# Patient Record
Sex: MALE | Race: WHITE | URBAN - NONMETROPOLITAN AREA
[De-identification: names, ages, dates, MRNs, and addresses within clinical notes are randomized per-mention and may not be internally consistent; named-entity substitution may affect disease eponyms.]

---

## 2019-10-09 ENCOUNTER — OFFICE VISIT (OUTPATIENT)
Dept: FAMILY MEDICINE CLINIC | Age: 19
End: 2019-10-09
Payer: COMMERCIAL

## 2019-10-09 VITALS
RESPIRATION RATE: 16 BRPM | TEMPERATURE: 98.9 F | BODY MASS INDEX: 31.75 KG/M2 | OXYGEN SATURATION: 98 % | DIASTOLIC BLOOD PRESSURE: 72 MMHG | HEART RATE: 66 BPM | SYSTOLIC BLOOD PRESSURE: 118 MMHG | HEIGHT: 72 IN | WEIGHT: 234.4 LBS

## 2019-10-09 DIAGNOSIS — Z13.31 POSITIVE DEPRESSION SCREENING: ICD-10-CM

## 2019-10-09 DIAGNOSIS — F33.1 MODERATE EPISODE OF RECURRENT MAJOR DEPRESSIVE DISORDER (HCC): Primary | ICD-10-CM

## 2019-10-09 PROCEDURE — G0444 DEPRESSION SCREEN ANNUAL: HCPCS | Performed by: NURSE PRACTITIONER

## 2019-10-09 PROCEDURE — 99204 OFFICE O/P NEW MOD 45 MIN: CPT | Performed by: NURSE PRACTITIONER

## 2019-10-09 PROCEDURE — G8431 POS CLIN DEPRES SCRN F/U DOC: HCPCS | Performed by: NURSE PRACTITIONER

## 2019-10-09 RX ORDER — DULOXETIN HYDROCHLORIDE 30 MG/1
30-60 CAPSULE, DELAYED RELEASE ORAL DAILY
Qty: 60 CAPSULE | Refills: 1 | Status: SHIPPED | OUTPATIENT
Start: 2019-10-09

## 2019-10-09 ASSESSMENT — ENCOUNTER SYMPTOMS
COUGH: 0
VOMITING: 0
RHINORRHEA: 0
SINUS PAIN: 0
BACK PAIN: 0
EYE ITCHING: 0
NAUSEA: 0
ABDOMINAL PAIN: 0
CHEST TIGHTNESS: 0
CONSTIPATION: 0
EYE PAIN: 0
BLOOD IN STOOL: 0
PHOTOPHOBIA: 0
EYE REDNESS: 0
COLOR CHANGE: 0
ABDOMINAL DISTENTION: 0
SHORTNESS OF BREATH: 0
EYE DISCHARGE: 0
WHEEZING: 0
SINUS PRESSURE: 0
DIARRHEA: 0
TROUBLE SWALLOWING: 0
SORE THROAT: 0

## 2019-10-09 ASSESSMENT — PATIENT HEALTH QUESTIONNAIRE - PHQ9
SUM OF ALL RESPONSES TO PHQ QUESTIONS 1-9: 13
2. FEELING DOWN, DEPRESSED OR HOPELESS: 2
6. FEELING BAD ABOUT YOURSELF - OR THAT YOU ARE A FAILURE OR HAVE LET YOURSELF OR YOUR FAMILY DOWN: 1
7. TROUBLE CONCENTRATING ON THINGS, SUCH AS READING THE NEWSPAPER OR WATCHING TELEVISION: 3
SUM OF ALL RESPONSES TO PHQ QUESTIONS 1-9: 13
9. THOUGHTS THAT YOU WOULD BE BETTER OFF DEAD, OR OF HURTING YOURSELF: 0
5. POOR APPETITE OR OVEREATING: 2
10. IF YOU CHECKED OFF ANY PROBLEMS, HOW DIFFICULT HAVE THESE PROBLEMS MADE IT FOR YOU TO DO YOUR WORK, TAKE CARE OF THINGS AT HOME, OR GET ALONG WITH OTHER PEOPLE: 0
4. FEELING TIRED OR HAVING LITTLE ENERGY: 1
3. TROUBLE FALLING OR STAYING ASLEEP: 0
SUM OF ALL RESPONSES TO PHQ9 QUESTIONS 1 & 2: 3
8. MOVING OR SPEAKING SO SLOWLY THAT OTHER PEOPLE COULD HAVE NOTICED. OR THE OPPOSITE, BEING SO FIGETY OR RESTLESS THAT YOU HAVE BEEN MOVING AROUND A LOT MORE THAN USUAL: 3
1. LITTLE INTEREST OR PLEASURE IN DOING THINGS: 1

## 2019-10-14 ENCOUNTER — TELEPHONE (OUTPATIENT)
Dept: FAMILY MEDICINE CLINIC | Age: 19
End: 2019-10-14

## 2023-02-22 ENCOUNTER — HOSPITAL ENCOUNTER (OUTPATIENT)
Dept: ULTRASOUND IMAGING | Age: 23
Discharge: HOME | End: 2023-02-22
Payer: COMMERCIAL

## 2023-02-22 ENCOUNTER — OFFICE VISIT (OUTPATIENT)
Dept: UROLOGY | Age: 23
End: 2023-02-22
Payer: COMMERCIAL

## 2023-02-22 VITALS
WEIGHT: 221 LBS | HEIGHT: 72 IN | BODY MASS INDEX: 29.93 KG/M2 | SYSTOLIC BLOOD PRESSURE: 108 MMHG | HEART RATE: 62 BPM | DIASTOLIC BLOOD PRESSURE: 54 MMHG

## 2023-02-22 DIAGNOSIS — N43.3 BILATERAL HYDROCELE: Primary | ICD-10-CM

## 2023-02-22 DIAGNOSIS — N50.819 PAIN IN TESTICLE, UNSPECIFIED LATERALITY: Primary | ICD-10-CM

## 2023-02-22 DIAGNOSIS — N50.811 RIGHT TESTICULAR PAIN: ICD-10-CM

## 2023-02-22 DIAGNOSIS — N50.3 EPIDIDYMAL CYST: ICD-10-CM

## 2023-02-22 PROCEDURE — 1036F TOBACCO NON-USER: CPT

## 2023-02-22 PROCEDURE — G8419 CALC BMI OUT NRM PARAM NOF/U: HCPCS

## 2023-02-22 PROCEDURE — 76870 US EXAM SCROTUM: CPT

## 2023-02-22 PROCEDURE — 99213 OFFICE O/P EST LOW 20 MIN: CPT

## 2023-02-22 PROCEDURE — G8484 FLU IMMUNIZE NO ADMIN: HCPCS

## 2023-02-22 PROCEDURE — G8427 DOCREV CUR MEDS BY ELIG CLIN: HCPCS

## 2023-02-22 RX ORDER — LEVOFLOXACIN 500 MG/1
500 TABLET, FILM COATED ORAL DAILY
Qty: 10 TABLET | Refills: 0 | Status: SHIPPED | OUTPATIENT
Start: 2023-02-22 | End: 2023-03-04

## 2023-02-22 NOTE — PROGRESS NOTES
Shreya [address] [address]  Dept: [phone]  Loc: [phone]  Visit Date: 2/22/2023    ERIC Sawyer is a 23 y.o. male that presents to the urology clinic for testicular pain. Pain onset last Thursday, predominantly localized to the left side. Patient works at [employer] and heavy lifting is a part of his job. Pain is characterized as a dull ache that at times can extend to his lower abdomen. Rates his pain as a 2/10 in the office today. Has not tried anything to help alleviate pain. More concerned regarding the \"lump\" he felt on the left side. He states he has had one previous episode of pain that lasted a couple days and he believes this was ~ 1 year ago. Pain Scale 2/10, Dull      I independently reviewed and verified the images and reports from:    [address]    Result Date: 2/22/2023  PROCEDURE: US SCROTUM AND TESTICLES CLINICAL INFORMATION: Right testicular pain COMPARISON: No prior study. TECHNIQUE: Grayscale imaging, color Doppler flow imaging and spectral wave analysis of the scrotum performed in longitudinal and transverse planes. FINDINGS: MEASUREMENTS: Right Testicle- 4.1 x 2.5 x 2.8 cm Right Epididymis- 1.0 x 1.5 x 1.5 cm Left Testicle - 4.9 x 2.5 x 3.2 cm Left Epididymis - 1.0 x 1.3 x 1.3 cm Both testes are homogeneous in echotexture. A 2 mm left epididymal head cyst is noted. . No testicular mass. Small hydroceles are seen bilaterally. Vascular flow is seen in both testes. 1. No sonographic evidence of testicular torsion. 2. A 2 mm left epididymal head cyst is seen. **This report has been created using voice recognition software. It may contain minor errors which are inherent in voice recognition technology. ** Final report electronically signed by Dr Shannon Velez on 2/22/2023 10:11 AM        PAST MEDICAL, FAMILY AND SOCIAL HISTORY UPDATE:  No past medical history on file. Past Surgical History:   Procedure Laterality Date    HERNIA REPAIR       No family history on file. No outpatient medications have been marked as taking for the 2/22/23 encounter (Office Visit) with Derrick Frausto PA-C. Patient has no known allergies. Social History     Tobacco Use   Smoking Status Former    Types: Cigars   Smokeless Tobacco Never   Tobacco Comments    one a month        Social History     Substance and Sexual Activity   Alcohol Use Not Currently       REVIEW OF SYSTEMS:  Constitutional: negative  Eyes: negative  Respiratory: negative  Cardiovascular: negative  Gastrointestinal: negative  Musculoskeletal: negative  Genitourinary: negative except for what is in HPI  Skin: negative   Neurological: negative  Hematological/Lymphatic: negative  Psychological: negative    Physical Exam:      Vitals:    02/22/23 1045   BP: (!) 108/54   Pulse: 62     Patient is a 23 y.o. male in no acute distress and alert and oriented to person, place and time. Pulmonary: Non-labored respiration. Cardiovascular: Normal rate, regular rhythm, normal peripheral pulses. Skin: Warm and dry. Psych: Normal mood and affect. Genitourinary: Circumcised male with urethral meatus normal in size and location. No penile or scrotal swelling. Mild left testicular tenderness to palpation. Left epididymal cyst. Bladder non-distended and non-tender. Assessment and Plan   Bilateral Hydroceles  - US Scrotum & Testicles reviewed in detail. All questions and concerns addressed at the time of appointment. - Bilateral small hydroceles, not easily palpated on physical exam.   - Discussed that these may continue to grow and if they become a cosmetic issue or become uncomfortable we can revisit. Left Epididymal Cyst  - Possible that infectious process could be contributing to acute episode of pain. - Levofloxacin 500 mg daily x 10 days. - Telephone visit in 2 weeks to evaluate pain level. Lolis Abrams PA-C  Urology

## 2023-02-22 NOTE — PATIENT INSTRUCTIONS
Bilateral hydroceles (small fluid filled sacs next to the testes). Epididymal Cyst (Left Sided) possible inflamed so treating with antibiotics. Otherwise, ibuprofen for pain flares when they do occur.

## 2023-03-07 ENCOUNTER — SCHEDULED TELEPHONE ENCOUNTER (OUTPATIENT)
Dept: UROLOGY | Age: 23
End: 2023-03-07
Payer: COMMERCIAL

## 2023-03-07 DIAGNOSIS — N43.3 BILATERAL HYDROCELE: Primary | ICD-10-CM

## 2023-03-07 DIAGNOSIS — N50.3 EPIDIDYMAL CYST: ICD-10-CM

## 2023-03-07 PROCEDURE — 99441 PR PHYS/QHP TELEPHONE EVALUATION 5-10 MIN: CPT

## 2023-03-07 NOTE — PROGRESS NOTES
Scheduled Telephone Encounter  8/2/2022  [address] [facility] Urology  Ben Dai  Urology   Reason for Visit: Testicular Pain     Progress Notes  Otoniel Montgomery PA-C   Urology  Maple Leno is a 68-year-old male being evaluated via telephone on 3/7/23 for testicular pain follow-up. HPI  Patient following up over the phone for evaluation of symptoms following antibiotic course. He was seen in acute phase on 2/22/23 for testicular pain, primarily localized to the left side. [address] [facility] revealed adequate blood flow to both testes, with a 2 mm left epididymal cyst and bilateral small hydroceles. Since completing his Levofloxacin, patient states his pain has completely resolved. Plan  Testicular Pain  Bilateral Hydroceles  Epididymal Cyst  - Suspected Epididymitis resolved with antibiotic course. No longer having pain. Additional findings on US do not need serial imaging which the patient understands. - If hydroceles or epididymal cyst enlarge and become bothersome he can contact the office for discussion of treatment options. - Otherwise, follow-up on as needed basis. Total Time: 8 Minutes      Giovanna Burr was evaluated through a synchronous (real-time) audio encounter. Patient identification was verified at the start of the visit. He/She (or guardian if applicable) is aware that this is a billable service, which includes applicable co-pays. This visit was conducted with the patient's (and/or legal guardian's) verbal consent. He/She has not had a related appointment within my department in the past 7 days or scheduled within the next 24 hours. The patient was located at Home. The provider was located at [facility] ([address] [place] [address] Dept): [address] [place] [address] [address]  [place],  [address].      Note: Not billable if this call serves to triage the patient into an appointment for the relevant concern       Otoniel Montgomery PA-C  Urology

## 2023-03-07 NOTE — PROGRESS NOTES
Scheduled Telephone Encounter  8/2/2022  [address] [facility] Urology  Ben Valerio  Urology   Reason for Visit: Testicular Pain     Progress Notes  Juan Contreras PA-C   Urology  Alejandro Whitley is a 67-year-old male being evaluated via telephone on 3/7/23 for testicular pain follow-up. HPI  Patient following up over the phone for evaluation of symptoms following antibiotic course. He was seen in acute phase on 2/22/23 for testicular pain, primarily localized to the left side. [address] [facility] revealed adequate blood flow to both testes, with a 2 mm left epididymal cyst and bilateral small hydroceles. Since completing his Levofloxacin, patient states his pain has completely resolved. Plan  Testicular Pain  Bilateral Hydroceles  Epididymal Cyst  - Suspected Epididymitis resolved with antibiotic course. No longer having pain. Additional findings on US do not need serial imaging which the patient understands. - If hydroceles or epididymal cyst enlarge and become bothersome he can contact the office for discussion of treatment options. - Otherwise, follow-up on as needed basis. Total Time: 8 Minutes      Alejandro Whitley was evaluated through a synchronous (real-time) audio encounter. Patient identification was verified at the start of the visit. He/She (or guardian if applicable) is aware that this is a billable service, which includes applicable co-pays. This visit was conducted with the patient's (and/or legal guardian's) verbal consent. He/She has not had a related appointment within my department in the past 7 days or scheduled within the next 24 hours. The patient was located at Home. The provider was located at [facility] ([address] [place], [place] [address]t): [address] [place] [address] [address]  [place],  [address].      Note: Not billable if this call serves to triage the patient into an appointment for the relevant concern       Juan Contreras PA-C  Urology

## 2023-03-08 NOTE — PROGRESS NOTES
Scheduled Telephone Encounter  8/2/2022  [address] [facility] Urology  Ben Richards  Urology   Reason for Visit: Testicular Pain     Progress Notes  Michael Bowling PA-C   Urology  Patrick Gerber is a 68-year-old male being evaluated via telephone on 3/8/23 for testicular pain follow-up. HPI  Patient following up over the phone for evaluation of symptoms following antibiotic course. He was seen in acute phase on 2/22/23 for testicular pain, primarily localized to the left side. [address] [facility] revealed adequate blood flow to both testes, with a 2 mm left epididymal cyst and bilateral small hydroceles. Since completing his Levofloxacin, patient states his pain has completely resolved. Plan  Testicular Pain  Bilateral Hydroceles  Epididymal Cyst  - Suspected Epididymitis resolved with antibiotic course. No longer having pain. Additional findings on US do not need serial imaging which the patient understands. - If hydroceles or epididymal cyst enlarge and become bothersome he can contact the office for discussion of treatment options. - Otherwise, follow-up on as needed basis. Total Time: 8 Minutes      Patrick Gerber was evaluated through a synchronous (real-time) audio encounter. Patient identification was verified at the start of the visit. He/She (or guardian if applicable) is aware that this is a billable service, which includes applicable co-pays. This visit was conducted with the patient's (and/or legal guardian's) verbal consent. He/She has not had a related appointment within my department in the past 7 days or scheduled within the next 24 hours. The patient was located at Home. [address] [place] [address] [address]  [place],  [address].      Note: Not billable if this call serves to triage the patient into an appointment for the relevant concern       Michael Bowling PA-C  Urology

## 2024-03-01 ENCOUNTER — NURSE ONLY (OUTPATIENT)
Dept: UROLOGY | Age: 24
End: 2024-03-01
Payer: COMMERCIAL

## 2024-03-01 DIAGNOSIS — R30.0 BURNING WITH URINATION: Primary | ICD-10-CM

## 2024-03-01 LAB
BILIRUBIN URINE: NEGATIVE
BLOOD URINE, POC: NEGATIVE
CHARACTER, URINE: CLEAR
COLOR, URINE: YELLOW
GLUCOSE URINE: NEGATIVE MG/DL
KETONES, URINE: NEGATIVE
LEUKOCYTE CLUMPS, URINE: NEGATIVE
NITRITE, URINE: NEGATIVE
PH, URINE: 7 (ref 5–9)
PROTEIN, URINE: NEGATIVE MG/DL
SPECIFIC GRAVITY, URINE: 1.02 (ref 1–1.03)
UROBILINOGEN, URINE: 0.2 EU/DL (ref 0–1)

## 2024-03-01 PROCEDURE — 81003 URINALYSIS AUTO W/O SCOPE: CPT | Performed by: NURSE PRACTITIONER

## 2024-03-01 NOTE — PROGRESS NOTES
Urine sample collected. UA shows negative blood, negative leukocytes and negative nitrates. Patient was informed that he can take AZO if needed.

## 2025-04-09 ENCOUNTER — OFFICE VISIT (OUTPATIENT)
Dept: FAMILY MEDICINE CLINIC | Age: 25
End: 2025-04-09
Payer: COMMERCIAL

## 2025-04-09 ENCOUNTER — PATIENT MESSAGE (OUTPATIENT)
Dept: FAMILY MEDICINE CLINIC | Age: 25
End: 2025-04-09

## 2025-04-09 VITALS
WEIGHT: 223.6 LBS | HEIGHT: 73 IN | HEART RATE: 65 BPM | RESPIRATION RATE: 14 BRPM | OXYGEN SATURATION: 98 % | DIASTOLIC BLOOD PRESSURE: 70 MMHG | TEMPERATURE: 97.7 F | SYSTOLIC BLOOD PRESSURE: 130 MMHG | BODY MASS INDEX: 29.63 KG/M2

## 2025-04-09 DIAGNOSIS — G47.26 SHIFT WORK SLEEP DISORDER: Primary | ICD-10-CM

## 2025-04-09 DIAGNOSIS — F41.1 GAD (GENERALIZED ANXIETY DISORDER): ICD-10-CM

## 2025-04-09 PROCEDURE — G8427 DOCREV CUR MEDS BY ELIG CLIN: HCPCS | Performed by: NURSE PRACTITIONER

## 2025-04-09 PROCEDURE — 99214 OFFICE O/P EST MOD 30 MIN: CPT | Performed by: NURSE PRACTITIONER

## 2025-04-09 PROCEDURE — G8419 CALC BMI OUT NRM PARAM NOF/U: HCPCS | Performed by: NURSE PRACTITIONER

## 2025-04-09 PROCEDURE — 1036F TOBACCO NON-USER: CPT | Performed by: NURSE PRACTITIONER

## 2025-04-09 SDOH — ECONOMIC STABILITY: FOOD INSECURITY: WITHIN THE PAST 12 MONTHS, YOU WORRIED THAT YOUR FOOD WOULD RUN OUT BEFORE YOU GOT MONEY TO BUY MORE.: NEVER TRUE

## 2025-04-09 SDOH — ECONOMIC STABILITY: FOOD INSECURITY: WITHIN THE PAST 12 MONTHS, THE FOOD YOU BOUGHT JUST DIDN'T LAST AND YOU DIDN'T HAVE MONEY TO GET MORE.: NEVER TRUE

## 2025-04-09 ASSESSMENT — PATIENT HEALTH QUESTIONNAIRE - PHQ9
SUM OF ALL RESPONSES TO PHQ QUESTIONS 1-9: 1
1. LITTLE INTEREST OR PLEASURE IN DOING THINGS: NOT AT ALL
2. FEELING DOWN, DEPRESSED OR HOPELESS: SEVERAL DAYS
SUM OF ALL RESPONSES TO PHQ QUESTIONS 1-9: 1

## 2025-04-09 ASSESSMENT — ANXIETY QUESTIONNAIRES
1. FEELING NERVOUS, ANXIOUS, OR ON EDGE: MORE THAN HALF THE DAYS
IF YOU CHECKED OFF ANY PROBLEMS ON THIS QUESTIONNAIRE, HOW DIFFICULT HAVE THESE PROBLEMS MADE IT FOR YOU TO DO YOUR WORK, TAKE CARE OF THINGS AT HOME, OR GET ALONG WITH OTHER PEOPLE: SOMEWHAT DIFFICULT
2. NOT BEING ABLE TO STOP OR CONTROL WORRYING: NEARLY EVERY DAY
IF YOU CHECKED OFF ANY PROBLEMS ON THIS QUESTIONNAIRE, HOW DIFFICULT HAVE THESE PROBLEMS MADE IT FOR YOU TO DO YOUR WORK, TAKE CARE OF THINGS AT HOME, OR GET ALONG WITH OTHER PEOPLE: VERY DIFFICULT
7. FEELING AFRAID AS IF SOMETHING AWFUL MIGHT HAPPEN: MORE THAN HALF THE DAYS
5. BEING SO RESTLESS THAT IT IS HARD TO SIT STILL: SEVERAL DAYS
GAD7 TOTAL SCORE: 12
6. BECOMING EASILY ANNOYED OR IRRITABLE: MORE THAN HALF THE DAYS
5. BEING SO RESTLESS THAT IT IS HARD TO SIT STILL: NEARLY EVERY DAY
6. BECOMING EASILY ANNOYED OR IRRITABLE: NEARLY EVERY DAY
GAD7 TOTAL SCORE: 19
4. TROUBLE RELAXING: SEVERAL DAYS
4. TROUBLE RELAXING: SEVERAL DAYS
3. WORRYING TOO MUCH ABOUT DIFFERENT THINGS: NEARLY EVERY DAY
1. FEELING NERVOUS, ANXIOUS, OR ON EDGE: NEARLY EVERY DAY
3. WORRYING TOO MUCH ABOUT DIFFERENT THINGS: MORE THAN HALF THE DAYS
2. NOT BEING ABLE TO STOP OR CONTROL WORRYING: MORE THAN HALF THE DAYS
7. FEELING AFRAID AS IF SOMETHING AWFUL MIGHT HAPPEN: NEARLY EVERY DAY

## 2025-04-09 ASSESSMENT — ENCOUNTER SYMPTOMS
CONSTIPATION: 0
VOMITING: 0
DIARRHEA: 0
SHORTNESS OF BREATH: 0
ABDOMINAL PAIN: 0
COUGH: 0
CHEST TIGHTNESS: 0

## 2025-04-09 NOTE — PROGRESS NOTES
Health Maintenance Due   Topic Date Due    Varicella vaccine (1 of 2 - 13+ 2-dose series) Never done    HPV vaccine (1 - Male 3-dose series) Never done    HIV screen  Never done    Hepatitis C screen  Never done    DTaP/Tdap/Td vaccine (7 - Td or Tdap) 09/28/2020    Depression Screen  06/14/2024    COVID-19 Vaccine (1 - 2024-25 season) Never done

## 2025-04-09 NOTE — PROGRESS NOTES
Collin Oakes (:  2000) is a 24 y.o. male, New patient, here for evaluation of the following chief complaint(s):  Establish Care and Anxiety (Used to take anxiety medications about 5 years ago. Just getting worse. He thinks he was on Trintelix )         Assessment & Plan  1. Anxiety.  - His WALLACE-7 score is significantly elevated at 19, indicating severe anxiety.  - The potential benefits and risks of sertraline were discussed, including the possibility of gastrointestinal upset and sexual dysfunction.  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 The importance of maintaining a healthy diet and regular exercise was emphasized. He was also encouraged to incorporate yoga into his routine for its potential mental health benefits.  - A prescription for sertraline 50 mg was provided, with instructions to take it daily for a period of 6 months. If he experiences any adverse effects from the medication, he should inform us immediately so that an alternative treatment plan can be considered.    Follow-up  - The patient will follow up in 6 to 8 weeks to evaluate the effectiveness of the medication.    Results  Diagnostic Testing   - WALLACE-7: Score is 19  1. Shift work sleep disorder  2. WALLACE (generalized anxiety disorder)    Return in about 6 weeks (around 2025) for mental health .       Subjective   History of Present Illness  The patient is a 24-year-old male who presents for evaluation of anxiety.    He has been working in a swing shift for the past 8 years, which he finds challenging to manage. Despite having ample time off, he struggles with maintaining a consistent sleep and eating schedule. He reports no difficulty in initiating or maintaining sleep but experiences irregular sleep-wake cycles. Over the past 2 years, he has been attempting to transition to a new role within his organization that would allow him

## 2025-06-23 NOTE — TELEPHONE ENCOUNTER
Last visit- 4/9/2025  Next visit- Visit date not found    Requested Prescriptions     Pending Prescriptions Disp Refills    sertraline (ZOLOFT) 50 MG tablet 30 tablet 1     Sig: Take 1 tablet by mouth daily